# Patient Record
Sex: FEMALE | URBAN - METROPOLITAN AREA
[De-identification: names, ages, dates, MRNs, and addresses within clinical notes are randomized per-mention and may not be internally consistent; named-entity substitution may affect disease eponyms.]

---

## 2021-06-07 ENCOUNTER — TELEPHONE ENCOUNTER (OUTPATIENT)
Dept: URBAN - METROPOLITAN AREA CLINIC 35 | Facility: CLINIC | Age: 51
End: 2021-06-07

## 2021-07-15 ENCOUNTER — OFFICE VISIT (OUTPATIENT)
Dept: URBAN - METROPOLITAN AREA CLINIC 35 | Facility: CLINIC | Age: 51
End: 2021-07-15

## 2021-07-15 VITALS
HEIGHT: 61 IN | OXYGEN SATURATION: 98 % | WEIGHT: 135 LBS | BODY MASS INDEX: 25.49 KG/M2 | SYSTOLIC BLOOD PRESSURE: 110 MMHG | HEART RATE: 76 BPM | DIASTOLIC BLOOD PRESSURE: 68 MMHG

## 2021-07-15 RX ORDER — NORTRIPTYLINE HYDROCHLORIDE 10 MG/1
1 CAPSULE CAPSULE ORAL ONCE A DAY
Qty: 30 | Status: ACTIVE | COMMUNITY

## 2021-07-15 RX ORDER — OMEPRAZOLE 40 MG/1
1 CAPSULE CAPSULE, DELAYED RELEASE ORAL
Qty: 30 | Status: ACTIVE | COMMUNITY

## 2021-07-15 RX ORDER — METOCLOPRAMIDE 10 MG/1
1 TABLET BEFORE MEALS TABLET ORAL TWICE A DAY
Qty: 60 | Status: ACTIVE | COMMUNITY

## 2021-07-15 RX ORDER — SUCRALFATE 1 G/1
1 TABLET ON AN EMPTY STOMACH, 2 HOURS APART FROM OTHER MEDS TABLET ORAL TWICE A DAY
Status: ACTIVE | COMMUNITY

## 2021-07-15 NOTE — EXAM-MIGRATED EXAMINATIONS
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

## 2021-07-15 NOTE — HPI-MIGRATED HPI
;     Abdominal Pain :                        51 year old female patient presents for abdominal pain consult. Patient admits she has been experiencing symptoms since 2017. Patient describes symptoms as epigastric pain sometimes stabbing  .   Patient admits symptoms are more present early mornings .  She admits she is currently taking Omeprazole 40mg , Sucralfate 1 gm , Metoclopramide , and nortriptyline without any relief . She admits intermittent nausea . Patient denies having any recent imaging.  Patient admits last EGD was 2019 with benign gastric polyp.   Patient is concerned because of her family hx of gastric cancer .   ;

## 2021-07-22 ENCOUNTER — OFFICE VISIT (OUTPATIENT)
Dept: URBAN - METROPOLITAN AREA SURGERY CENTER 8 | Facility: SURGERY CENTER | Age: 51
End: 2021-07-22

## 2021-08-10 ENCOUNTER — OFFICE VISIT (OUTPATIENT)
Dept: URBAN - METROPOLITAN AREA CLINIC 35 | Facility: CLINIC | Age: 51
End: 2021-08-10

## 2021-08-10 VITALS
HEART RATE: 78 BPM | WEIGHT: 133 LBS | OXYGEN SATURATION: 97 % | BODY MASS INDEX: 25.11 KG/M2 | HEIGHT: 61 IN | SYSTOLIC BLOOD PRESSURE: 105 MMHG | DIASTOLIC BLOOD PRESSURE: 65 MMHG

## 2021-08-10 PROBLEM — 79922009 EPIGASTRIC PAIN: Status: ACTIVE | Noted: 2021-07-15

## 2021-08-10 PROBLEM — 429006005 FAMILY HISTORY OF MALIGNANT NEOPLASM OF GASTROINTESTINAL TRACT: Status: ACTIVE | Noted: 2021-08-10

## 2021-08-10 PROBLEM — 428283002 HISTORY OF POLYP OF COLON (SITUATION): Status: ACTIVE | Noted: 2021-08-10

## 2021-08-10 PROBLEM — 102614006 GENERALIZED ABDOMINAL PAIN: Status: ACTIVE | Noted: 2021-07-15

## 2021-08-10 RX ORDER — METOCLOPRAMIDE 10 MG/1
1 TABLET BEFORE MEALS TABLET ORAL TWICE A DAY
Qty: 60 | Status: ON HOLD | COMMUNITY

## 2021-08-10 RX ORDER — OMEPRAZOLE 40 MG/1
1 CAPSULE CAPSULE, DELAYED RELEASE ORAL
Qty: 30 | Status: ON HOLD | COMMUNITY

## 2021-08-10 RX ORDER — SUCRALFATE 1 G/1
1 TABLET ON AN EMPTY STOMACH, 2 HOURS APART FROM OTHER MEDS TABLET ORAL TWICE A DAY
Status: ACTIVE | COMMUNITY

## 2021-08-10 RX ORDER — NORTRIPTYLINE HYDROCHLORIDE 10 MG/1
1 CAPSULE CAPSULE ORAL ONCE A DAY
Qty: 30 | Status: ON HOLD | COMMUNITY

## 2021-08-10 NOTE — HPI-MIGRATED HPI
;     Abdominal Pain : 51 year old female presents today for follow up to her EGD and abdominal pain and to review her US Abd. Her EGD was completed on 07/22/2021, US completed on 07/21/2021 with results noted below.   Since the procedure she denies any complications.  Patient denies dysphagia, globus, changes in appetite.  She admits that there aren't any improvement in abdominal pain since her last visit. Patient continues to experience epigastric pain and bloating. She also admits  intermittent lower abdominal discomfort which is dull and mild described as a vibrating sensation.   Patient admits 1  soft and loose bowel movement daily. Patient denies mucus, melena, or blood in stools.   Patient admits taking Sucralfate 1 GM. Patient admits that she stopped taking Nortriptyline 10 mg, Metoclopramide 10 mg, and Omeprazole 40 mg as recommended by Dr. Barrios.      Of Last Visit: 07/15/2021 51 year old female patient presents for abdominal pain consult. Patient admits she has been experiencing symptoms since 2017. Patient describes symptoms as epigastric pain sometimes stabbing  .   Patient admits symptoms are more present early mornings .  She admits she is currently taking Omeprazole 40 mg , Sucralfate 1 GM , Metoclopramide , and nortriptyline without any relief . She admits intermittent nausea . Patient denies having any recent imaging.  Patient admits last EGD was 2019 with benign gastric polyp.   Patient is concerned because of her family hx of gastric cancer .;

## 2023-11-15 ENCOUNTER — LAB OUTSIDE AN ENCOUNTER (OUTPATIENT)
Dept: URBAN - METROPOLITAN AREA CLINIC 35 | Facility: CLINIC | Age: 53
End: 2023-11-15

## 2023-11-15 ENCOUNTER — OFFICE VISIT (OUTPATIENT)
Dept: URBAN - METROPOLITAN AREA CLINIC 35 | Facility: CLINIC | Age: 53
End: 2023-11-15
Payer: COMMERCIAL

## 2023-11-15 VITALS
WEIGHT: 135 LBS | SYSTOLIC BLOOD PRESSURE: 104 MMHG | HEART RATE: 77 BPM | DIASTOLIC BLOOD PRESSURE: 72 MMHG | OXYGEN SATURATION: 97 % | BODY MASS INDEX: 25.49 KG/M2 | HEIGHT: 61 IN

## 2023-11-15 DIAGNOSIS — Z12.11 ENCOUNTER FOR SCREENING FOR MALIGNANT NEOPLASM OF COLON: ICD-10-CM

## 2023-11-15 DIAGNOSIS — Z80.0 FAMILY HISTORY- STOMACH CANCER: ICD-10-CM

## 2023-11-15 PROBLEM — 275108004: Status: ACTIVE | Noted: 2023-11-15

## 2023-11-15 PROBLEM — 305058001: Status: ACTIVE | Noted: 2023-11-15

## 2023-11-15 PROCEDURE — 99213 OFFICE O/P EST LOW 20 MIN: CPT | Performed by: INTERNAL MEDICINE

## 2023-11-15 RX ORDER — OMEPRAZOLE 40 MG/1
1 CAPSULE CAPSULE, DELAYED RELEASE ORAL
Qty: 30 | Status: ON HOLD | COMMUNITY

## 2023-11-15 RX ORDER — NORTRIPTYLINE HYDROCHLORIDE 10 MG/1
1 CAPSULE CAPSULE ORAL ONCE A DAY
Qty: 30 | Status: ON HOLD | COMMUNITY

## 2023-11-15 RX ORDER — SUCRALFATE 1 G/1
1 TABLET ON AN EMPTY STOMACH, 2 HOURS APART FROM OTHER MEDS TABLET ORAL TWICE A DAY
Status: ON HOLD | COMMUNITY

## 2023-11-15 RX ORDER — SODIUM, POTASSIUM,MAG SULFATES 17.5-3.13G
177 ML AS DIRECTED SOLUTION, RECONSTITUTED, ORAL ORAL TWICE A DAY
Qty: 354 ML | Refills: 0 | OUTPATIENT
Start: 2023-11-15 | End: 2023-11-16

## 2023-11-15 RX ORDER — METOCLOPRAMIDE 10 MG/1
1 TABLET BEFORE MEALS TABLET ORAL TWICE A DAY
Qty: 60 | Status: ON HOLD | COMMUNITY

## 2023-11-15 NOTE — HPI-ABDOMINAL PAIN
patient presents for abdominal pain consult. Patient admits she has been experiencing symptoms for several months .  Patient describes symptoms as cramping .  Patient states symptoms are located in LLQ.  Patient admits symptoms wake her from her sleep .   Patient denies nausea or vomiting.Patient denies having any recent imaging.

## 2023-11-15 NOTE — HPI-COLORECTAL CANCER SCREENING
53 year old female patient presents for colorectal cancer screening consult. Patient is here due to age . Patient admits this will be first colonoscopy. Patient denies family hx of colon cancer. Patient denies family hx of colon polyps.Patient admits  normal bowel habits at this time. Patient denies any current symptoms of rectal bleeding, melena or mucus.Patient denies any concerns at this time

## 2023-12-21 ENCOUNTER — CLAIMS CREATED FROM THE CLAIM WINDOW (OUTPATIENT)
Dept: URBAN - METROPOLITAN AREA CLINIC 4 | Facility: CLINIC | Age: 53
End: 2023-12-21
Payer: COMMERCIAL

## 2023-12-21 ENCOUNTER — OUT OF OFFICE VISIT (OUTPATIENT)
Dept: URBAN - METROPOLITAN AREA SURGERY CENTER 8 | Facility: SURGERY CENTER | Age: 53
End: 2023-12-21
Payer: COMMERCIAL

## 2023-12-21 ENCOUNTER — CLAIMS CREATED FROM THE CLAIM WINDOW (OUTPATIENT)
Dept: URBAN - METROPOLITAN AREA SURGERY CENTER 8 | Facility: SURGERY CENTER | Age: 53
End: 2023-12-21

## 2023-12-21 DIAGNOSIS — Z12.11 COLON CANCER SCREENING: ICD-10-CM

## 2023-12-21 DIAGNOSIS — K63.5 BENIGN COLON POLYP: ICD-10-CM

## 2023-12-21 DIAGNOSIS — D12.3 ADENOMA OF TRANSVERSE COLON: ICD-10-CM

## 2023-12-21 DIAGNOSIS — K31.89 ACHYLIA: ICD-10-CM

## 2023-12-21 DIAGNOSIS — R10.13 ABDOMINAL DISCOMFORT, EPIGASTRIC: ICD-10-CM

## 2023-12-21 DIAGNOSIS — R68.89 ERYTHEMATOUS MUCOSA: ICD-10-CM

## 2023-12-21 DIAGNOSIS — K31.7 BENIGN GASTRIC POLYP: ICD-10-CM

## 2023-12-21 DIAGNOSIS — K63.89 APPENDICITIS EPIPLOICA: ICD-10-CM

## 2023-12-21 DIAGNOSIS — K29.70 GASTRITIS, UNSPECIFIED, WITHOUT BLEEDING: ICD-10-CM

## 2023-12-21 DIAGNOSIS — K63.5 BENIGN COLON POLYPS: ICD-10-CM

## 2023-12-21 PROCEDURE — 00813 ANES UPR LWR GI NDSC PX: CPT | Performed by: NURSE ANESTHETIST, CERTIFIED REGISTERED

## 2023-12-21 PROCEDURE — 43239 EGD BIOPSY SINGLE/MULTIPLE: CPT | Performed by: INTERNAL MEDICINE

## 2023-12-21 PROCEDURE — 88312 SPECIAL STAINS GROUP 1: CPT | Performed by: PATHOLOGY

## 2023-12-21 PROCEDURE — G8907 PT DOC NO EVENTS ON DISCHARG: HCPCS | Performed by: INTERNAL MEDICINE

## 2023-12-21 PROCEDURE — 88305 TISSUE EXAM BY PATHOLOGIST: CPT | Performed by: PATHOLOGY

## 2023-12-21 PROCEDURE — 45380 COLONOSCOPY AND BIOPSY: CPT | Performed by: INTERNAL MEDICINE

## 2023-12-21 RX ORDER — NORTRIPTYLINE HYDROCHLORIDE 10 MG/1
1 CAPSULE CAPSULE ORAL ONCE A DAY
Qty: 30 | Status: ON HOLD | COMMUNITY

## 2023-12-21 RX ORDER — METOCLOPRAMIDE 10 MG/1
1 TABLET BEFORE MEALS TABLET ORAL TWICE A DAY
Qty: 60 | Status: ON HOLD | COMMUNITY

## 2023-12-21 RX ORDER — SUCRALFATE 1 G/1
1 TABLET ON AN EMPTY STOMACH, 2 HOURS APART FROM OTHER MEDS TABLET ORAL TWICE A DAY
Status: ON HOLD | COMMUNITY

## 2023-12-21 RX ORDER — OMEPRAZOLE 40 MG/1
1 CAPSULE CAPSULE, DELAYED RELEASE ORAL
Qty: 30 | Status: ON HOLD | COMMUNITY

## 2024-01-19 ENCOUNTER — OFFICE VISIT (OUTPATIENT)
Dept: URBAN - METROPOLITAN AREA CLINIC 35 | Facility: CLINIC | Age: 54
End: 2024-01-19
Payer: COMMERCIAL

## 2024-01-19 ENCOUNTER — DASHBOARD ENCOUNTERS (OUTPATIENT)
Age: 54
End: 2024-01-19

## 2024-01-19 VITALS
DIASTOLIC BLOOD PRESSURE: 68 MMHG | BODY MASS INDEX: 26.24 KG/M2 | SYSTOLIC BLOOD PRESSURE: 112 MMHG | HEART RATE: 82 BPM | HEIGHT: 61 IN | OXYGEN SATURATION: 98 % | WEIGHT: 139 LBS

## 2024-01-19 DIAGNOSIS — Z80.0 FAMILY HISTORY- STOMACH CANCER: ICD-10-CM

## 2024-01-19 DIAGNOSIS — D12.2 BENIGN NEOPLASM OF ASCENDING COLON: ICD-10-CM

## 2024-01-19 PROBLEM — 91985000: Status: ACTIVE | Noted: 2024-01-19

## 2024-01-19 PROCEDURE — 99213 OFFICE O/P EST LOW 20 MIN: CPT | Performed by: INTERNAL MEDICINE

## 2024-01-19 RX ORDER — METOCLOPRAMIDE 10 MG/1
1 TABLET BEFORE MEALS TABLET ORAL TWICE A DAY
Qty: 60 | Status: ON HOLD | COMMUNITY

## 2024-01-19 RX ORDER — SUCRALFATE 1 G/1
1 TABLET ON AN EMPTY STOMACH, 2 HOURS APART FROM OTHER MEDS TABLET ORAL TWICE A DAY
Status: ON HOLD | COMMUNITY

## 2024-01-19 RX ORDER — OMEPRAZOLE 40 MG/1
1 CAPSULE CAPSULE, DELAYED RELEASE ORAL
Qty: 30 | Status: ON HOLD | COMMUNITY

## 2024-01-19 RX ORDER — NORTRIPTYLINE HYDROCHLORIDE 10 MG/1
1 CAPSULE CAPSULE ORAL ONCE A DAY
Qty: 30 | Status: ON HOLD | COMMUNITY

## 2024-01-19 NOTE — HPI-ABDOMINAL PAIN
Female patient presents today for a follow up from her endoscopy. She admits/denies any complications after her procedure. Since her procedure she admits/denies any episodes ofdysphagia, globus, a change in appetite or bowel habits.  Impression Report:  - Normal esophagus. - Z-line regular, 35 cm from the incisors. - Erythematous mucosa in the antrum. Biopsied. - Erythematous mucosa in the gastric body. Biopsied. - Two gastric polyps. Resected and retrieved. - Normal examined duodenum     Of last visit (11/15/2023) patient presents for abdominal pain consult. Patient admits she has been experiencing symptoms for several months .  Patient describes symptoms as cramping .  Patient states symptoms are located in LLQ.  Patient admits symptoms wake her from her sleep .   Patient denies nausea or vomiting.Patient denies having any recent imaging.

## 2024-01-19 NOTE — HPI-COLONOSCOPY FOLLOWUP
53 year old female patient presents today for a followup from her colonoscopy. She admits/denies any complications after her procedure. She currentlyreports -- bowel movements per --, with/without strain. Her stools are --and --. She admits/denies any mucus, melena or blood in stools. Admits/Denies any pruritus ani or rectal pain.    Impression Report:  -Two 2 to 3 mm polyps at the hepatic flexure, removed with a cold biopsy forceps. -One 2 mm polyp in the ascending colon, removed with a cold biopsy forceps. Resected and retrieved. - Two 2 to 3 mm polyps in the rectum, removed with a cold biopsy forceps. Resected and retrieved. - The distal rectum and anal verge are normal on retroflexion view.  Pathology Report:  (  (D) Colon, Hepatic Flexure, Polypectomy:SESSILE SERRATED ADENOMA(S)/POLYP(S).  (E) Colon, Ascending, Polypectomy:PROMINENT MUCOSAL LYMPHOID AGGREGATES.Negative for Dysplasia or Malignancy.  (F) Rectum, Polypectomy:HYPERPLASTIC POLYP(S).